# Patient Record
Sex: MALE | Race: WHITE
[De-identification: names, ages, dates, MRNs, and addresses within clinical notes are randomized per-mention and may not be internally consistent; named-entity substitution may affect disease eponyms.]

---

## 2018-03-21 ENCOUNTER — HOSPITAL ENCOUNTER (EMERGENCY)
Dept: HOSPITAL 17 - NEPD | Age: 23
Discharge: HOME | End: 2018-03-21
Payer: COMMERCIAL

## 2018-03-21 VITALS
SYSTOLIC BLOOD PRESSURE: 117 MMHG | DIASTOLIC BLOOD PRESSURE: 75 MMHG | OXYGEN SATURATION: 100 % | HEART RATE: 77 BPM | RESPIRATION RATE: 14 BRPM

## 2018-03-21 VITALS
OXYGEN SATURATION: 100 % | TEMPERATURE: 98.6 F | SYSTOLIC BLOOD PRESSURE: 133 MMHG | HEART RATE: 92 BPM | RESPIRATION RATE: 22 BRPM | DIASTOLIC BLOOD PRESSURE: 63 MMHG

## 2018-03-21 DIAGNOSIS — Z72.0: ICD-10-CM

## 2018-03-21 DIAGNOSIS — R94.31: ICD-10-CM

## 2018-03-21 DIAGNOSIS — R55: Primary | ICD-10-CM

## 2018-03-21 DIAGNOSIS — R07.89: ICD-10-CM

## 2018-03-21 LAB
BASOPHILS # BLD AUTO: 0.1 TH/MM3 (ref 0–0.2)
BASOPHILS NFR BLD: 0.4 % (ref 0–2)
BUN SERPL-MCNC: 9 MG/DL (ref 7–18)
CALCIUM SERPL-MCNC: 9.1 MG/DL (ref 8.5–10.1)
CHLORIDE SERPL-SCNC: 102 MEQ/L (ref 98–107)
CREAT SERPL-MCNC: 0.93 MG/DL (ref 0.6–1.3)
EOSINOPHIL # BLD: 0 TH/MM3 (ref 0–0.4)
EOSINOPHIL NFR BLD: 0.3 % (ref 0–4)
ERYTHROCYTE [DISTWIDTH] IN BLOOD BY AUTOMATED COUNT: 12.8 % (ref 11.6–17.2)
GFR SERPLBLD BASED ON 1.73 SQ M-ARVRAT: 104 ML/MIN (ref 89–?)
GLUCOSE SERPL-MCNC: 87 MG/DL (ref 74–106)
HCO3 BLD-SCNC: 26.4 MEQ/L (ref 21–32)
HCT VFR BLD CALC: 40.5 % (ref 39–51)
HGB BLD-MCNC: 13.9 GM/DL (ref 13–17)
LYMPHOCYTES # BLD AUTO: 1.8 TH/MM3 (ref 1–4.8)
LYMPHOCYTES NFR BLD AUTO: 12.8 % (ref 9–44)
MCH RBC QN AUTO: 30.6 PG (ref 27–34)
MCHC RBC AUTO-ENTMCNC: 34.3 % (ref 32–36)
MCV RBC AUTO: 89.3 FL (ref 80–100)
MONOCYTE #: 1.2 TH/MM3 (ref 0–0.9)
MONOCYTES NFR BLD: 8.5 % (ref 0–8)
NEUTROPHILS # BLD AUTO: 11.1 TH/MM3 (ref 1.8–7.7)
NEUTROPHILS NFR BLD AUTO: 78 % (ref 16–70)
PLATELET # BLD: 302 TH/MM3 (ref 150–450)
PMV BLD AUTO: 6.9 FL (ref 7–11)
RBC # BLD AUTO: 4.54 MIL/MM3 (ref 4.5–5.9)
SODIUM SERPL-SCNC: 138 MEQ/L (ref 136–145)
WBC # BLD AUTO: 14.3 TH/MM3 (ref 4–11)

## 2018-03-21 PROCEDURE — 80307 DRUG TEST PRSMV CHEM ANLYZR: CPT

## 2018-03-21 PROCEDURE — 85025 COMPLETE CBC W/AUTO DIFF WBC: CPT

## 2018-03-21 PROCEDURE — 99285 EMERGENCY DEPT VISIT HI MDM: CPT

## 2018-03-21 PROCEDURE — 80048 BASIC METABOLIC PNL TOTAL CA: CPT

## 2018-03-21 PROCEDURE — 93005 ELECTROCARDIOGRAM TRACING: CPT

## 2018-03-21 PROCEDURE — 70450 CT HEAD/BRAIN W/O DYE: CPT

## 2018-03-21 NOTE — RADRPT
EXAM DATE/TIME:  03/21/2018 15:33 

 

[facility] COMPARISON:     

No previous studies available for comparison.

 

 

INDICATIONS :     

Sudden onset severe headache. 

                      

 

RADIATION DOSE:     

49.94 CTDIvol (mGy) 

 

 

 

MEDICAL HISTORY :     

None  

 

SURGICAL HISTORY :      

None. 

 

ENCOUNTER:      

Initial

 

ACUITY:      

1 day

 

PAIN SCALE:      

7/10

 

LOCATION:       

Bilateral cranial 

 

TECHNIQUE:     

Multiple contiguous axial images were obtained of the head.  Using automated exposure control and adj
ustment of the mA and/or kV according to patient size, radiation dose was kept as low as reasonably a
chievable to obtain optimal diagnostic quality images.   DICOM format image data is available electro
nically for review and comparison.  

 

FINDINGS:     

The ventricles are symmetric and normal in appearance. No abnormal extra-axial fluid collections are 
identified. There is no evidence of intracranial hemorrhage or mass. There is nothing to suggest acut
e infarction.

 

There is mucosal disease present in the visualized left maxillary sinus. Extracranial structures are 
otherwise grossly unremarkable.

 

CONCLUSION:     

No acute intracranial findings.

Left maxillary sinusitis

 

 

 

 Erik Corey MD on March 21, 2018 at 15:36           

Board Certified Radiologist.

 This report was verified electronically.

## 2018-03-21 NOTE — PD
HPI


Chief Complaint:  Medical Clearance


Time Seen by Provider:  19:19


Travel History


International Travel<30 days:  No


Contact w/Intl Traveler<30days:  No


Traveled to known affect area:  No





History of Present Illness


HPI


22-year-old white male presents emergency department for evaluation of near 

syncope.  The patient states that while at work today he had become hot all 

over.  He felt lightheaded and dizzy.  He felt as if he may pass out.  His 

supervisor at work advised him to sit down to get something to eat.  He states 

that he had sat down and his symptoms did seem to improve but returned only a 

few minutes later.  He denied any associated nausea vomiting.  No diaphoresis.  

He does report having some mild chest discomfort and feeling as if he cannot 

catch his breath.  Patient felt as if he may be having a mild anxiety attack.  

Patient presented to the ER for evaluation.  He once again stated that his 

symptoms had improved but on presentation he felt anxious again and felt as if 

he could not catch his breath.  He denied any trauma.  No recent illness.  He 

denies any history of syncope or presyncope in the past.  He denies any history 

of anxiety.  He denies any hypertension, diabetes or heart disease.  Mother has 

a family history of COPD.  No history of heart disease in the family or 

syncope.  No sudden death.  Patient does smoke cigarettes and drink alcohol.  

Denies any drugs.





PFSH


Past Medical History


Medical History:  Denies Significant Hx


Diminished Hearing:  No


Tetanus Vaccination:  > 5 Years


Influenza Vaccination:  No





Past Surgical History


Surgical History:  No Previous Surgery





Social History


Alcohol Use:  Yes (occasionally)


Tobacco Use:  Yes


Substance Use:  No





Allergies-Medications


(Allergen,Severity, Reaction):  


Coded Allergies:  


     No Known Allergies (Unverified , 3/21/18)


Reported Meds & Prescriptions





Reported Meds & Active Scripts


Active


No Active Prescriptions or Reported Medications    








Review of Systems


Except as stated in HPI:  all other systems reviewed are Neg





Physical Exam


Narrative


GENERAL:  Well-developed, well-nourished in no apparent distress. Nontoxic 

appearing.


HEAD: Normocephalic, atraumatic.


EYES: Pupils equal round and reactive. Extraocular motions intact. No scleral 

icterus. No injection or drainage. 


ENT: Nose clear. Throat without erythema, tonsillar hypertrophy or exudate. 

Uvula midline. Airway patent.


NECK: Trachea midline. Supple, nontender, moves head freely.  No central bony 

tenderness or spasm.


CARDIOVASCULAR: Regular rate and rhythm without murmurs, gallops, or rubs. 


RESPIRATORY: Clear to auscultation. Breath sounds equal bilaterally. No wheezes

, rales, or rhonchi.  


GASTROINTESTINAL: Abdomen soft, non-tender, nondistended. No hepato-splenomegaly

, or palpable masses. No guarding.


EXTREMITIES: No clubbing, cyanosis, or edema. No joint tenderness.


BACK: Nontender without deformity. No flank tenderness.


NEUROLOGICAL: Awake, alert and oriented x 3 .Cranial nerves grossly intact.  

Motor and sensory grossly within normal limits. Normal speech.





Data


Data


Last Documented VS





Vital Signs








  Date Time  Temp Pulse Resp B/P (MAP) Pulse Ox O2 Delivery O2 Flow Rate FiO2


 


3/21/18 19:11  77 14 117/75 (89) 100 Room Air  


 


3/21/18 15:12 98.6       








Orders





 Orders


Ct Brain W/O Iv Contrast(Rout) (3/21/18 )


Electrocardiogram (3/21/18 19:27)


Complete Blood Count With Diff (3/21/18 19:27)


Basic Metabolic Panel (Bmp) (3/21/18 19:27)


Drug Screen, Random Urine (3/21/18 19:27)


Ed Discharge Order (3/21/18 21:00)





Labs





Laboratory Tests








Test


  3/21/18


20:00


 


White Blood Count 14.3 TH/MM3 


 


Red Blood Count 4.54 MIL/MM3 


 


Hemoglobin 13.9 GM/DL 


 


Hematocrit 40.5 % 


 


Mean Corpuscular Volume 89.3 FL 


 


Mean Corpuscular Hemoglobin 30.6 PG 


 


Mean Corpuscular Hemoglobin


Concent 34.3 % 


 


 


Red Cell Distribution Width 12.8 % 


 


Platelet Count 302 TH/MM3 


 


Mean Platelet Volume 6.9 FL 


 


Neutrophils (%) (Auto) 78.0 % 


 


Lymphocytes (%) (Auto) 12.8 % 


 


Monocytes (%) (Auto) 8.5 % 


 


Eosinophils (%) (Auto) 0.3 % 


 


Basophils (%) (Auto) 0.4 % 


 


Neutrophils # (Auto) 11.1 TH/MM3 


 


Lymphocytes # (Auto) 1.8 TH/MM3 


 


Monocytes # (Auto) 1.2 TH/MM3 


 


Eosinophils # (Auto) 0.0 TH/MM3 


 


Basophils # (Auto) 0.1 TH/MM3 


 


CBC Comment DIFF FINAL 


 


Differential Comment  


 


Blood Urea Nitrogen 9 MG/DL 


 


Creatinine 0.93 MG/DL 


 


Random Glucose 87 MG/DL 


 


Calcium Level 9.1 MG/DL 


 


Sodium Level 138 MEQ/L 


 


Potassium Level 3.9 MEQ/L 


 


Chloride Level 102 MEQ/L 


 


Carbon Dioxide Level 26.4 MEQ/L 


 


Anion Gap 10 MEQ/L 


 


Estimat Glomerular Filtration


Rate 104 ML/MIN 


 


 


Urine Opiates Screen NEG 


 


Urine Barbiturates Screen NEG 


 


Urine Amphetamines Screen NEG 


 


Urine Benzodiazepines Screen NEG 


 


Urine Cocaine Screen NEG 


 


Urine Cannabinoids Screen NEG 











MDM


Medical Decision Making


Medical Screen Exam Complete:  Yes


Emergency Medical Condition:  Yes


Medical Record Reviewed:  Yes


Interpretation(s)


EKG: Sinus rhythm with a ventricular rate of 73.  Axis 97 slight right 

deviation.  No abnormal ST-T wave changes.


Laboratory Tests








Test


  3/21/18


20:00


 


White Blood Count 14.3 TH/MM3 


 


Red Blood Count 4.54 MIL/MM3 


 


Hemoglobin 13.9 GM/DL 


 


Hematocrit 40.5 % 


 


Mean Corpuscular Volume 89.3 FL 


 


Mean Corpuscular Hemoglobin 30.6 PG 


 


Mean Corpuscular Hemoglobin


Concent 34.3 % 


 


 


Red Cell Distribution Width 12.8 % 


 


Platelet Count 302 TH/MM3 


 


Mean Platelet Volume 6.9 FL 


 


Neutrophils (%) (Auto) 78.0 % 


 


Lymphocytes (%) (Auto) 12.8 % 


 


Monocytes (%) (Auto) 8.5 % 


 


Eosinophils (%) (Auto) 0.3 % 


 


Basophils (%) (Auto) 0.4 % 


 


Neutrophils # (Auto) 11.1 TH/MM3 


 


Lymphocytes # (Auto) 1.8 TH/MM3 


 


Monocytes # (Auto) 1.2 TH/MM3 


 


Eosinophils # (Auto) 0.0 TH/MM3 


 


Basophils # (Auto) 0.1 TH/MM3 


 


CBC Comment DIFF FINAL 


 


Differential Comment  


 


Blood Urea Nitrogen 9 MG/DL 


 


Creatinine 0.93 MG/DL 


 


Random Glucose 87 MG/DL 


 


Calcium Level 9.1 MG/DL 


 


Sodium Level 138 MEQ/L 


 


Potassium Level 3.9 MEQ/L 


 


Chloride Level 102 MEQ/L 


 


Carbon Dioxide Level 26.4 MEQ/L 


 


Anion Gap 10 MEQ/L 


 


Estimat Glomerular Filtration


Rate 104 ML/MIN 


 


 


Urine Opiates Screen NEG 


 


Urine Barbiturates Screen NEG 


 


Urine Amphetamines Screen NEG 


 


Urine Benzodiazepines Screen NEG 


 


Urine Cocaine Screen NEG 


 


Urine Cannabinoids Screen NEG 








Last 24 hours Impressions








Head CT 3/21/18 0000 Signed





Impressions: 





 Service Date/Time:  Wednesday, March 21, 2018 15:33 - CONCLUSION:  No acute 





 intracranial findings. Left maxillary sinusitis     Erik Corey MD 








Differential Diagnosis


Differential diagnosis: Arrhythmia, electrolyte abnormality, syncope, near 

syncope, vasovagal


Narrative Course


Patient sounds as if he had a vasovagal near syncopal events.  His evaluation 

today is very reassuring.  EKG, CT and laboratory tests are unremarkable.





Patient is advised to follow-up with the [facility] and is stable for 

discharge.





Diagnosis





 Primary Impression:  


 Vasovagal near syncope


Referrals:  


[facility]


2 days


Patient Instructions:  General Instructions


Departure Forms:  Tests/Procedures, Work Release   


   Special Instructions:  No work 2 days.





***Additional Instructions:  


Rest.


Increase fluids.


Avoid any stressors.


No work for the next 2 days.


Follow-up with the [facility] in 2 days.


Return to the ER if symptoms worsen or problems develop.


***Med/Other Pt SpecificInfo:  No Meds Exist/No RX given


Scripts


No Active Prescriptions or Reported Meds


Disposition:  01 DISCHARGE HOME


Condition:  Stable











Prince Lynch Mar 21, 2018 19:38

## 2018-03-22 NOTE — EKG
Date Performed: 03/21/2018       Time Performed: 20:32:57

 

PTAGE:      22 years

 

EKG:      Sinus rhythm 

 

 WITH SINUS ARRHYTHMIA BORDERLINE RIGHT AXIS DEVIATION BORDERLINE ECG 

 

NO PREVIOUS TRACING            

 

DOCTOR:   Vasu Zhou  Interpretating Date/Time  03/22/2018 14:17:01